# Patient Record
Sex: MALE | Race: WHITE | NOT HISPANIC OR LATINO | Employment: FULL TIME | URBAN - METROPOLITAN AREA
[De-identification: names, ages, dates, MRNs, and addresses within clinical notes are randomized per-mention and may not be internally consistent; named-entity substitution may affect disease eponyms.]

---

## 2020-01-10 ENCOUNTER — OFFICE VISIT (OUTPATIENT)
Dept: NEUROSURGERY | Facility: CLINIC | Age: 40
End: 2020-01-10

## 2020-01-10 VITALS
BODY MASS INDEX: 31.45 KG/M2 | HEART RATE: 76 BPM | HEIGHT: 78 IN | SYSTOLIC BLOOD PRESSURE: 130 MMHG | WEIGHT: 271.8 LBS | DIASTOLIC BLOOD PRESSURE: 81 MMHG

## 2020-01-10 DIAGNOSIS — G95.89 INTRADURAL MASS (HCC): Primary | ICD-10-CM

## 2020-01-10 PROCEDURE — 99204 OFFICE O/P NEW MOD 45 MIN: CPT | Performed by: NEUROLOGICAL SURGERY

## 2020-01-10 RX ORDER — DICLOFENAC SODIUM 75 MG/1
75 TABLET, DELAYED RELEASE ORAL
COMMUNITY
Start: 2019-12-19 | End: 2020-12-18

## 2020-01-10 NOTE — PROGRESS NOTES
Subjective   History of Present Illness: Krzysztof Torres is a 39 y.o. male is here today as a self referral for intermittent back pain that radiates into the left leg pain with mild weakness began on or around June 2019 although his wife states he may have been struggling a little bit longer than that. He denies numbness, tingling and bladder/bowel issuies.  The patient has tried chiropractor manipulation without much relief. He has also had one steroid injection. He has also tried oral steroid. He denies any known injury.  Since the steroids he is essentially been pain-free for the last week or two.  Denies saddle anesthesia, bowel or bladder incontinence.    He is essentially asymptomatic today    Back Pain   The problem occurs intermittently. The quality of the pain is described as shooting, stabbing and aching. The pain radiates to the left knee, left thigh and left foot. The symptoms are aggravated by position. Associated symptoms include leg pain and weakness. Pertinent negatives include no bladder incontinence, bowel incontinence, chest pain, numbness or tingling. He has tried chiropractic manipulation (Oral steroid, injectable steroid) for the symptoms. The treatment provided mild relief.       The following portions of the patient's history were reviewed and updated as appropriate: allergies, current medications, past family history, past medical history, past social history, past surgical history and problem list.    Review of Systems   HENT: Negative.    Eyes: Negative.    Respiratory: Negative.  Negative for chest tightness and shortness of breath.    Cardiovascular: Negative.  Negative for chest pain.   Gastrointestinal: Negative.  Negative for bowel incontinence.   Endocrine: Negative.    Genitourinary: Negative.  Negative for bladder incontinence.   Musculoskeletal: Positive for back pain.   Skin: Negative.    Allergic/Immunologic: Negative.    Neurological: Positive for weakness. Negative for  "tingling and numbness.   Hematological: Negative.    Psychiatric/Behavioral: Negative.    All other systems reviewed and are negative.      Objective     Vitals:    01/10/20 1456   BP: 130/81   Pulse: 76   Weight: 123 kg (271 lb 12.8 oz)   Height: 198.1 cm (78\")     Body mass index is 31.41 kg/m².      Physical Exam  Neurologic Exam    Physical Exam:    CONSTITUTIONAL: This 39 year old right handed  male appears well developed, well-nourished and in no acute distress.    HEAD & FACE: the head and face are symmetric, normocephalic and atraumatic.    EYES: Inspection of the conjunctivae and lids reveals no swelling, erythema or discharge.  Pupils are round, equal and reactive to light and there is no scleral icterus.    EARS, NOSE, MOUTH & THROAT: On inspection, the ears and nose are within normal limits.    NECK: the neck is supple and symmetric. The trachea is midline with no masses.    PULMONARY: Respiratory effort is normal with no increased work of breathing or signs of respiratory distress.    CARDIOVASCULAR: Pedal pulses are +2/4 bilaterally. Examination of the extremities shows no edema or varicosities.    LYMPHATIC: There is no palpable lymphadenopathy of the neck.    MUSCULOSKELETAL: Gait and station are within normal limits. The spine has normal alignment and range of motion,    SKIN: The skin is warm, dry and intact    NEUROLOGIC:   Cranial Nerves 2-12 intact  Normal motor strength noted. Muscle bulk and tone are normal.  Sensory exam is normal to all modalities.  Reflexes on the right side demonstrates 2/4 Triceps Reflex, 2/4 Biceps Reflex, 2/4 Brachioradialis Reflex, 2/4 Knee Jerk Reflex, 1/4 Ankle Jerk Reflex and no ankle clonus on the right.   Reflexes on the left side demonstrates 2/4 Triceps Reflex, 2/4 Biceps Reflex, 2/4 Brachioradialis Reflex, 2/4 Knee Jerk Reflex, 1/4 Ankle Jerk Reflex and no ankle clonus on the left.  Superficial/Primitive Reflexes: primitive reflexes were absent.  " Evens's, Babinski and clonus are all negative  No coordination deficit observed.  Radicular testing showed a negative Stevie (BRENDA) test and negative straight leg raise.  Cortical function is intact and without deficits. Speech is normal.    PSYCHIATRIC: oriented to person, place and time. Patient's mood and affect are normal.    Assessment/Plan   Independent Review of Radiographic Studies:      I personally reviewed the images from the following studies.    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  I agree the differential in this scenario would be schwannoma versus meningioma among the most common possibilities.    Medical Decision Making:      Certainly is an unusual presentation of an intraspinal mass.  For a intraspinal mass of this size really the only intervention I can offer is surgical intervention for the purposes of diagnosis and treatment.  This is difficult to offer in the presence of no symptoms at the present time.  I suspect giving the size and location of the lesion and the fact that he did have significant left leg pain at one point this will present itself to require surgery.  Given the option of considering upfront surgery versus short-term follow-up and they have elected to proceed with a 3-month follow-up MRI scan with and without contrast.  We discussed a lumbar laminectomy at L4-5 for opening the dura and under neuro monitoring perform resection of the mass.  This will allow for diagnosis and treatment simultaneously.  I explained the risks especially in the presence of a schwannoma there is the possibility of isolated loss of nerve function either motor or sensory along the affected nerves trajectory.  This troubles the patient since he remains asymptomatic at the  present time.      They asked about alternatives but certainly there are no injectable treatments, radiation or other medical management that would be appropriate for the potential diagnosis of schwannoma or meningioma.  I encouraged them to discuss what we have spoken about at length today since this is certainly not an emergent condition and he is welcome to follow-up sooner or as late as 3 months with repeat MRI of the lumbar spine with and without contrast to discuss further.      Return in about 3 months (around 4/10/2020) for review of completed images.    Krzysztof was seen today for back pain.    Diagnoses and all orders for this visit:    Intradural mass (CMS/HCC)  -     MRI Lumbar Spine With & Without Contrast; Future             Christopher Hirsch MD FACS [facility]  Neurological Surgery

## 2020-09-09 ENCOUNTER — TELEPHONE (OUTPATIENT)
Dept: NEUROSURGERY | Facility: CLINIC | Age: 40
End: 2020-09-09

## 2020-09-09 NOTE — TELEPHONE ENCOUNTER
I called and LVM for  Hectorolvinmaryana about following up with MRI and his follow-up appointment.

## 2020-10-05 ENCOUNTER — TELEPHONE (OUTPATIENT)
Dept: NEUROSURGERY | Facility: CLINIC | Age: 40
End: 2020-10-05